# Patient Record
Sex: MALE | URBAN - METROPOLITAN AREA
[De-identification: names, ages, dates, MRNs, and addresses within clinical notes are randomized per-mention and may not be internally consistent; named-entity substitution may affect disease eponyms.]

---

## 2020-06-03 ENCOUNTER — OFFICE VISIT (OUTPATIENT)
Dept: URBAN - METROPOLITAN AREA CLINIC 14 | Facility: CLINIC | Age: 70
End: 2020-06-03
Payer: MEDICARE

## 2020-06-03 DIAGNOSIS — H16.072 PERFORATED CORNEAL ULCER, LEFT EYE: Primary | ICD-10-CM

## 2020-06-03 PROCEDURE — 92002 INTRM OPH EXAM NEW PATIENT: CPT | Performed by: OPHTHALMOLOGY

## 2020-06-03 NOTE — IMPRESSION/PLAN
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 Plan: Use Tobradex TID OS x 3 days, then taper as directed. Lid Scrubs QD-BID.

## 2020-06-08 RX ORDER — TOBRAMYCIN AND DEXAMETHASONE 3; 1 MG/ML; MG/ML
SUSPENSION/ DROPS OPHTHALMIC
Qty: 1 | Refills: 0 | Status: INACTIVE
Start: 2020-06-08 | End: 2020-07-15

## 2020-06-12 ENCOUNTER — OFFICE VISIT (OUTPATIENT)
Dept: URBAN - METROPOLITAN AREA CLINIC 14 | Facility: CLINIC | Age: 70
End: 2020-06-12
Payer: MEDICARE

## 2020-06-12 DIAGNOSIS — H25.13 AGE-RELATED NUCLEAR CATARACT, BILATERAL: ICD-10-CM

## 2020-06-12 PROCEDURE — 92014 COMPRE OPH EXAM EST PT 1/>: CPT | Performed by: OPHTHALMOLOGY

## 2020-06-12 RX ORDER — BACITRACIN 500 [USP'U]/G
OINTMENT OPHTHALMIC
Qty: 1 | Refills: 0 | Status: INACTIVE
Start: 2020-06-12 | End: 2020-07-15

## 2020-06-12 ASSESSMENT — INTRAOCULAR PRESSURE: OS: 18

## 2020-06-12 NOTE — IMPRESSION/PLAN
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 Plan: Use  Tobradex BID OS x 5 days, then QD OS x 5 days. Add Bacitrican DESTINI QHS OS x 10 days then D/C. If doesn't improve, call office. Continue Ocusoft lid scrubs.

## 2020-07-15 ENCOUNTER — OFFICE VISIT (OUTPATIENT)
Dept: URBAN - METROPOLITAN AREA CLINIC 14 | Facility: CLINIC | Age: 70
End: 2020-07-15
Payer: MEDICARE

## 2020-07-15 PROCEDURE — 92012 INTRM OPH EXAM EST PATIENT: CPT | Performed by: OPHTHALMOLOGY

## 2020-07-15 RX ORDER — FLUOROMETHOLONE 2.5 MG/ML
0.25 % SUSPENSION/ DROPS OPHTHALMIC
Qty: 1 | Refills: 0 | Status: INACTIVE
Start: 2020-07-15 | End: 2021-06-25

## 2020-07-15 RX ORDER — CIPROFLOXACIN HYDROCHLORIDE 3.5 MG/ML
0.3 % SOLUTION/ DROPS TOPICAL
Qty: 1 | Refills: 0 | Status: INACTIVE
Start: 2020-07-15 | End: 2020-07-16

## 2020-07-15 ASSESSMENT — INTRAOCULAR PRESSURE
OS: 16
OD: 16

## 2020-07-15 NOTE — IMPRESSION/PLAN
Impression: Marginal corneal ulcer, left eye: H16.938.  Plan: Recommend pt to start Ciprofloxacin 1 gtt QID x 10 days then d/c OS and FML 1 gtt tid x 6 days then bid x 6 days then qd x 6 days then d/c OS

## 2020-07-24 ENCOUNTER — OFFICE VISIT (OUTPATIENT)
Dept: URBAN - METROPOLITAN AREA CLINIC 14 | Facility: CLINIC | Age: 70
End: 2020-07-24
Payer: MEDICARE

## 2020-07-24 DIAGNOSIS — H01.8 OTHER SPECIFIED INFLAMMATIONS OF EYELID: ICD-10-CM

## 2020-07-24 DIAGNOSIS — H16.042 MARGINAL CORNEAL ULCER, LEFT EYE: Primary | ICD-10-CM

## 2020-07-24 PROCEDURE — 99213 OFFICE O/P EST LOW 20 MIN: CPT | Performed by: OPHTHALMOLOGY

## 2020-07-24 ASSESSMENT — INTRAOCULAR PRESSURE
OD: 16
OS: 19

## 2020-07-24 NOTE — IMPRESSION/PLAN
Impression: Other specified inflammations of eyelid: H01.8. Plan: Patient instructed to apply warm compresses. Patient instructed to use artificial tears as needed. Lid scrubs and hygiene were explained.

## 2020-07-24 NOTE — IMPRESSION/PLAN
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 Plan: Pt has only been taking cyprofloxacin, slight improvement.  Recommend pt to continue with Cyprofloxacin 1 gtt qd x 10 days then FML 1 gtt tid x 5 days then bid x 5 days then qd x 5 days then d/c

## 2021-06-25 ENCOUNTER — OFFICE VISIT (OUTPATIENT)
Dept: URBAN - METROPOLITAN AREA CLINIC 14 | Facility: CLINIC | Age: 71
End: 2021-06-25
Payer: MEDICARE

## 2021-06-25 PROCEDURE — 92014 COMPRE OPH EXAM EST PT 1/>: CPT | Performed by: OPHTHALMOLOGY

## 2021-06-25 RX ORDER — BACITRACIN 500 [USP'U]/G
OINTMENT OPHTHALMIC
Qty: 1 | Refills: 0 | Status: ACTIVE
Start: 2021-06-25

## 2021-06-25 ASSESSMENT — INTRAOCULAR PRESSURE
OD: 16
OS: 16

## 2021-06-25 NOTE — IMPRESSION/PLAN
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 Plan: Healed ulcer noted on today's exam. Lid scrubs and hygiene were explained. Patient instructed to apply warm compresses. Patient instructed to use artificial tears as needed. Advised patient to start Bacitracin ilana OS QHS x 10 days then discontinue.

## 2021-07-14 ENCOUNTER — OFFICE VISIT (OUTPATIENT)
Dept: URBAN - METROPOLITAN AREA CLINIC 14 | Facility: CLINIC | Age: 71
End: 2021-07-14
Payer: MEDICARE

## 2021-07-14 PROCEDURE — 99213 OFFICE O/P EST LOW 20 MIN: CPT | Performed by: OPHTHALMOLOGY

## 2021-07-14 RX ORDER — MINOCYCLINE HYDROCHLORIDE 50 MG/1
50 MG CAPSULE ORAL
Qty: 60 | Refills: 0 | Status: ACTIVE
Start: 2021-07-14

## 2021-07-14 RX ORDER — FLUOROMETHOLONE 1 MG/ML
0.1 % SOLUTION/ DROPS OPHTHALMIC
Qty: 5 | Refills: 0 | Status: ACTIVE
Start: 2021-07-14

## 2021-07-14 RX ORDER — OFLOXACIN 3 MG/ML
0.3 % SOLUTION/ DROPS OPHTHALMIC
Qty: 5 | Refills: 0 | Status: ACTIVE
Start: 2021-07-14

## 2021-07-14 NOTE — IMPRESSION/PLAN
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 Plan: Discussed diagnosis with patient. Advised patient to start Minocycline 50mg PO QD x 60 days, Ofloxacin OS QID x 10 days FML OS TID x 5 days, BID x 5 days, QD x 5 days then discontinue. Hand written instructions were given to patient. Will continue to monitor closely. Patient to call us sooner, onset of new visual changes. Patient understands and agrees with plan.

## 2023-01-11 ENCOUNTER — OFFICE VISIT (OUTPATIENT)
Dept: URBAN - METROPOLITAN AREA CLINIC 15 | Facility: CLINIC | Age: 73
End: 2023-01-11
Payer: MEDICARE

## 2023-01-11 DIAGNOSIS — H16.042 MARGINAL CORNEAL ULCER, LEFT EYE: ICD-10-CM

## 2023-01-11 DIAGNOSIS — H01.004 BLEPHARITIS OF LEFT UPPER EYELID: Primary | ICD-10-CM

## 2023-01-11 PROCEDURE — 99214 OFFICE O/P EST MOD 30 MIN: CPT | Performed by: OPHTHALMOLOGY

## 2023-01-11 RX ORDER — BACITRACIN 500 [USP'U]/G
OINTMENT OPHTHALMIC
Qty: 1 | Refills: 0 | Status: ACTIVE
Start: 2023-01-11

## 2023-01-11 ASSESSMENT — INTRAOCULAR PRESSURE
OD: 17
OS: 16
OS: 18
OD: 16

## 2023-01-11 NOTE — IMPRESSION/PLAN
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 Plan: Discussed diagnosis with patient. Bacitracin ilana ERxed to be used QHS OS for 10 days, Discussed lid scrubs to be used BID BUL. Discussed can not dispense Rxed drugs without seeing pt in clinic, pt. expressed understanding. Will continue to monitor closely. Patient to call us sooner, onset of new visual changes. Patient understands and agrees with plan.
